# Patient Record
Sex: FEMALE | Race: WHITE | NOT HISPANIC OR LATINO | URBAN - METROPOLITAN AREA
[De-identification: names, ages, dates, MRNs, and addresses within clinical notes are randomized per-mention and may not be internally consistent; named-entity substitution may affect disease eponyms.]

---

## 2022-10-07 ENCOUNTER — NON-PROVIDER VISIT (OUTPATIENT)
Dept: URGENT CARE | Facility: PHYSICIAN GROUP | Age: 22
End: 2022-10-07

## 2022-10-07 DIAGNOSIS — Z02.1 PRE-EMPLOYMENT DRUG SCREENING: ICD-10-CM

## 2022-10-07 LAB
AMP AMPHETAMINE: NORMAL
COC COCAINE: NORMAL
INT CON NEG: NEGATIVE
INT CON POS: NEGATIVE
MET METHAMPHETAMINES: NORMAL
OPI OPIATES: NORMAL
PCP PHENCYCLIDINE: NORMAL
POC DRUG COMMENT 753798-OCCUPATIONAL HEALTH: NORMAL
THC: NORMAL

## 2022-10-07 PROCEDURE — 80305 DRUG TEST PRSMV DIR OPT OBS: CPT | Performed by: FAMILY MEDICINE

## 2024-09-22 ENCOUNTER — OFFICE VISIT (OUTPATIENT)
Dept: URGENT CARE | Facility: PHYSICIAN GROUP | Age: 24
End: 2024-09-22
Payer: MEDICAID

## 2024-09-22 ENCOUNTER — HOSPITAL ENCOUNTER (OUTPATIENT)
Facility: MEDICAL CENTER | Age: 24
End: 2024-09-22
Attending: PHYSICIAN ASSISTANT
Payer: MEDICAID

## 2024-09-22 VITALS
RESPIRATION RATE: 20 BRPM | HEIGHT: 66 IN | HEART RATE: 128 BPM | DIASTOLIC BLOOD PRESSURE: 74 MMHG | WEIGHT: 117.2 LBS | OXYGEN SATURATION: 97 % | BODY MASS INDEX: 18.84 KG/M2 | TEMPERATURE: 98.4 F | SYSTOLIC BLOOD PRESSURE: 120 MMHG

## 2024-09-22 DIAGNOSIS — R10.11 RUQ PAIN: ICD-10-CM

## 2024-09-22 DIAGNOSIS — R11.0 NAUSEA: ICD-10-CM

## 2024-09-22 DIAGNOSIS — N12 PYELONEPHRITIS: ICD-10-CM

## 2024-09-22 LAB
APPEARANCE UR: NORMAL
BILIRUB UR STRIP-MCNC: NORMAL MG/DL
COLOR UR AUTO: NORMAL
GLUCOSE UR STRIP.AUTO-MCNC: NORMAL MG/DL
KETONES UR STRIP.AUTO-MCNC: 40 MG/DL
LEUKOCYTE ESTERASE UR QL STRIP.AUTO: NORMAL
NITRITE UR QL STRIP.AUTO: NORMAL
PH UR STRIP.AUTO: 7.5 [PH] (ref 5–8)
POCT INT CON NEG: NEGATIVE
POCT INT CON POS: POSITIVE
POCT URINE PREGNANCY TEST: NEGATIVE
PROT UR QL STRIP: 100 MG/DL
RBC UR QL AUTO: NORMAL
SP GR UR STRIP.AUTO: 1.02
UROBILINOGEN UR STRIP-MCNC: 1 MG/DL

## 2024-09-22 PROCEDURE — 3074F SYST BP LT 130 MM HG: CPT | Performed by: PHYSICIAN ASSISTANT

## 2024-09-22 PROCEDURE — 81002 URINALYSIS NONAUTO W/O SCOPE: CPT | Performed by: PHYSICIAN ASSISTANT

## 2024-09-22 PROCEDURE — 87086 URINE CULTURE/COLONY COUNT: CPT

## 2024-09-22 PROCEDURE — 3078F DIAST BP <80 MM HG: CPT | Performed by: PHYSICIAN ASSISTANT

## 2024-09-22 PROCEDURE — 81025 URINE PREGNANCY TEST: CPT | Performed by: PHYSICIAN ASSISTANT

## 2024-09-22 PROCEDURE — 99204 OFFICE O/P NEW MOD 45 MIN: CPT | Mod: 25 | Performed by: PHYSICIAN ASSISTANT

## 2024-09-22 RX ORDER — SULFAMETHOXAZOLE AND TRIMETHOPRIM 800; 160 MG/1; MG/1
1 TABLET ORAL 2 TIMES DAILY
Qty: 14 TABLET | Refills: 0 | Status: SHIPPED | OUTPATIENT
Start: 2024-09-22

## 2024-09-22 RX ORDER — ONDANSETRON 4 MG/1
4 TABLET, ORALLY DISINTEGRATING ORAL EVERY 6 HOURS PRN
Qty: 15 TABLET | Refills: 0 | Status: SHIPPED | OUTPATIENT
Start: 2024-09-22

## 2024-09-22 ASSESSMENT — ENCOUNTER SYMPTOMS
CHILLS: 0
ABDOMINAL PAIN: 1
ARTHRALGIAS: 0
HEMATOCHEZIA: 0
VOMITING: 0
DIARRHEA: 0
MYALGIAS: 0
FLANK PAIN: 1
CARDIOVASCULAR NEGATIVE: 1
HEADACHES: 0
NAUSEA: 0
CONSTIPATION: 1
ANOREXIA: 0
RESPIRATORY NEGATIVE: 1
FEVER: 0

## 2024-09-22 NOTE — LETTER
September 22, 2024         Patient: Nataliya Marsh   YOB: 2000   Date of Visit: 9/22/2024           To Whom it May Concern:    Nataliya Marsh was seen in my clinic on 9/22/2024. Please excuse any absences from work this week due to acute condition.      If you have any questions or concerns, please don't hesitate to call.        Sincerely,           Alber Palomo P.A.-C.  Electronically Signed

## 2024-09-22 NOTE — PROGRESS NOTES
Subjective     Nataliya Marsh is a 24 y.o. female who presents with Abdominal Pain (Pt woke up around 2 am with cold sweats and pain in right side middle abdomen pain pt sts the pain has been in the same spot since 2 am /Pt sts the pain is also in her back on the right side )            Abdominal Pain  This is a new problem. The current episode started today. The onset quality is sudden. The problem occurs constantly. The problem has been unchanged. The pain is located in the RLQ. The quality of the pain is aching and cramping. The abdominal pain radiates to the right flank. Associated symptoms include constipation. Pertinent negatives include no anorexia, arthralgias, diarrhea, dysuria, fever, frequency, headaches, hematochezia, hematuria, melena, myalgias, nausea or vomiting. She has tried nothing for the symptoms. The treatment provided no relief. There is no history of GERD, irritable bowel syndrome or PUD.       PMH:  has no past medical history on file.  MEDS:   Current Outpatient Medications:     sulfamethoxazole-trimethoprim (BACTRIM DS) 800-160 MG tablet, Take 1 Tablet by mouth 2 times a day., Disp: 14 Tablet, Rfl: 0    ondansetron (ZOFRAN ODT) 4 MG TABLET DISPERSIBLE, Take 1 Tablet by mouth every 6 hours as needed for Nausea/Vomiting., Disp: 15 Tablet, Rfl: 0  ALLERGIES: No Known Allergies  SURGHX: No past surgical history on file.  SOCHX:    FH: family history is not on file.      Review of Systems   Constitutional:  Negative for chills and fever.   HENT: Negative.     Respiratory: Negative.     Cardiovascular: Negative.    Gastrointestinal:  Positive for abdominal pain and constipation. Negative for anorexia, blood in stool, diarrhea, hematochezia, melena, nausea and vomiting.   Genitourinary:  Positive for flank pain. Negative for dysuria, frequency, hematuria and urgency.   Musculoskeletal:  Negative for arthralgias and myalgias.   Neurological:  Negative for headaches.       Medications, Allergies,  "and current problem list reviewed today in Epic           Objective     /74   Pulse (!) 128   Temp 36.9 °C (98.4 °F) (Temporal)   Resp 20   Ht 1.676 m (5' 6\")   Wt 53.2 kg (117 lb 3.2 oz)   SpO2 97%   BMI 18.92 kg/m²      Physical Exam  Vitals and nursing note reviewed.   Constitutional:       General: She is not in acute distress.     Appearance: Normal appearance. She is well-developed. She is not ill-appearing, toxic-appearing or diaphoretic.   HENT:      Head: Normocephalic and atraumatic.      Right Ear: Tympanic membrane, ear canal and external ear normal.      Left Ear: Tympanic membrane, ear canal and external ear normal.      Nose: No congestion or rhinorrhea.      Mouth/Throat:      Mouth: Mucous membranes are moist.      Pharynx: No oropharyngeal exudate or posterior oropharyngeal erythema.   Eyes:      General:         Right eye: No discharge.         Left eye: No discharge.      Conjunctiva/sclera: Conjunctivae normal.   Cardiovascular:      Rate and Rhythm: Normal rate and regular rhythm.      Pulses: Normal pulses.      Heart sounds: Normal heart sounds.   Pulmonary:      Effort: Pulmonary effort is normal. No respiratory distress.      Breath sounds: Normal breath sounds. No stridor. No wheezing, rhonchi or rales.   Abdominal:      General: Abdomen is flat. Bowel sounds are normal. There is no distension.      Palpations: Abdomen is soft.      Tenderness: There is abdominal tenderness in the right upper quadrant. There is right CVA tenderness. There is no left CVA tenderness, guarding or rebound. Negative signs include Portillo's sign (??) and McBurney's sign.      Hernia: No hernia is present.       Musculoskeletal:      Cervical back: Normal range of motion and neck supple.      Right lower leg: No edema.      Left lower leg: No edema.   Lymphadenopathy:      Cervical: No cervical adenopathy.   Skin:     General: Skin is warm and dry.      Findings: No rash.   Neurological:      General: " No focal deficit present.      Mental Status: She is alert and oriented to person, place, and time. Mental status is at baseline.   Psychiatric:         Mood and Affect: Mood normal.         Behavior: Behavior normal.         Thought Content: Thought content normal.         Judgment: Judgment normal.                      Component  Ref Range & Units 1 d ago   GFR (CKD-EPI)  >60 mL/min/1.73 m 2 87           Component  Ref Range & Units 1 d ago   Sodium  135 - 145 mmol/L 132 Low    Potassium  3.6 - 5.5 mmol/L 4.3   Chloride  96 - 112 mmol/L 97   Co2  20 - 33 mmol/L 19 Low    Anion Gap  7.0 - 16.0 16.0   Glucose  65 - 99 mg/dL 90   Bun  8 - 22 mg/dL 10   Creatinine  0.50 - 1.40 mg/dL 0.94   Calcium  8.5 - 10.5 mg/dL 9.1   Correct Calcium  8.5 - 10.5 mg/dL 9.0   AST(SGOT)  12 - 45 U/L 26   ALT(SGPT)  2 - 50 U/L 18   Alkaline Phosphatase  30 - 99 U/L 98   Total Bilirubin  0.1 - 1.5 mg/dL 0.5   Albumin  3.2 - 4.9 g/dL 4.1   Total Protein  6.0 - 8.2 g/dL 7.2   Globulin  1.9 - 3.5 g/dL 3.1   A-G Ratio  g/dL 1.3          Component  Ref Range & Units 1 d ago   WBC  4.8 - 10.8 K/uL 16.3 High    RBC  4.20 - 5.40 M/uL 4.73   Hemoglobin  12.0 - 16.0 g/dL 14.6   Hematocrit  37.0 - 47.0 % 43.9   MCV  81.4 - 97.8 fL 92.8   MCH  27.0 - 33.0 pg 30.9   MCHC  32.2 - 35.5 g/dL 33.3   RDW  35.9 - 50.0 fL 46.4   Platelet Count  164 - 446 K/uL 259   MPV  9.0 - 12.9 fL 10.7   Neutrophils-Polys  44.00 - 72.00 % 75.80 High    Lymphocytes  22.00 - 41.00 % 11.80 Low    Monocytes  0.00 - 13.40 % 11.70   Eosinophils  0.00 - 6.90 % 0.20   Basophils  0.00 - 1.80 % 0.20   Immature Granulocytes  0.00 - 0.90 % 0.30   Nucleated RBC  0.00 - 0.20 /100 WBC 0.00   Neutrophils (Absolute)  1.82 - 7.42 K/uL 12.35 High    Comment: Includes immature neutrophils, if present.   Lymphs (Absolute)  1.00 - 4.80 K/uL 1.92   Monos (Absolute)  0.00 - 0.85 K/uL 1.91 High    Eos (Absolute)  0.00 - 0.51 K/uL 0.03   Baso (Absolute)  0.00 - 0.12 K/uL 0.04   Immature  Granulocytes (abs)  0.00 - 0.11 K/uL 0.05   NRBC (Absolute)  K/uL 0.00            Assessment & Plan        Assessment & Plan  Nausea    Orders:    ondansetron (ZOFRAN ODT) 4 MG TABLET DISPERSIBLE; Take 1 Tablet by mouth every 6 hours as needed for Nausea/Vomiting.    POCT Urinalysis    POCT PREGNANCY    CBC WITH DIFFERENTIAL; Future    Comp Metabolic Panel; Future    Pyelonephritis    Orders:    cefTRIAXone (Rocephin) 500 mg in lidocaine (Xylocaine) 1 % 2 mL for IM use    sulfamethoxazole-trimethoprim (BACTRIM DS) 800-160 MG tablet; Take 1 Tablet by mouth 2 times a day.    Urine Culture; Future    CBC WITH DIFFERENTIAL; Future    Comp Metabolic Panel; Future    RUQ pain    Orders:    US-RUQ; Future    CBC WITH DIFFERENTIAL; Future    Comp Metabolic Panel; Future            I personally reviewed prior external notes and test results pertinent to today's visit. Return to clinic or go to ED if symptoms worsen or persist. Red flag symptoms and indications for ED discussed at length. Patient/Parent/Guardian voices understanding.  AVS with post-visit instructions printed and provided or given verbally.  Follow-up with your primary care provider in 3-5 days. All side effects and potential interactions of prescribed medication discussed including allergic response, GI upset, tendon injury, rash, sedation, OCP effectiveness, etc.    Please note that this dictation was created using voice recognition software. I have made every reasonable attempt to correct obvious errors, but I expect that there are errors of grammar and possibly content that I did not discover before finalizing the note.         Patient/Parent/Guardian voices understanding.  AVS with post-visit instructions printed and provided or given verbally.  Follow-up with your primary care provider in 3-5 days. All side effects and potential interactions of prescribed medication discussed including allergic response, GI upset, tendon injury, rash, sedation, OCP effectiveness, etc.    Please note that this dictation was created using voice recognition software. I have made every reasonable attempt to correct obvious errors, but I expect that there are errors of grammar and possibly content that I did not discover before finalizing the note.

## 2024-09-23 ENCOUNTER — HOSPITAL ENCOUNTER (OUTPATIENT)
Dept: LAB | Facility: MEDICAL CENTER | Age: 24
End: 2024-09-23
Attending: PHYSICIAN ASSISTANT
Payer: MEDICAID

## 2024-09-23 DIAGNOSIS — N12 PYELONEPHRITIS: ICD-10-CM

## 2024-09-23 DIAGNOSIS — R10.11 RUQ PAIN: ICD-10-CM

## 2024-09-23 DIAGNOSIS — R11.0 NAUSEA: ICD-10-CM

## 2024-09-23 LAB
BASOPHILS # BLD AUTO: 0.2 % (ref 0–1.8)
BASOPHILS # BLD: 0.04 K/UL (ref 0–0.12)
EOSINOPHIL # BLD AUTO: 0.03 K/UL (ref 0–0.51)
EOSINOPHIL NFR BLD: 0.2 % (ref 0–6.9)
ERYTHROCYTE [DISTWIDTH] IN BLOOD BY AUTOMATED COUNT: 46.4 FL (ref 35.9–50)
HCT VFR BLD AUTO: 43.9 % (ref 37–47)
HGB BLD-MCNC: 14.6 G/DL (ref 12–16)
IMM GRANULOCYTES # BLD AUTO: 0.05 K/UL (ref 0–0.11)
IMM GRANULOCYTES NFR BLD AUTO: 0.3 % (ref 0–0.9)
LYMPHOCYTES # BLD AUTO: 1.92 K/UL (ref 1–4.8)
LYMPHOCYTES NFR BLD: 11.8 % (ref 22–41)
MCH RBC QN AUTO: 30.9 PG (ref 27–33)
MCHC RBC AUTO-ENTMCNC: 33.3 G/DL (ref 32.2–35.5)
MCV RBC AUTO: 92.8 FL (ref 81.4–97.8)
MONOCYTES # BLD AUTO: 1.91 K/UL (ref 0–0.85)
MONOCYTES NFR BLD AUTO: 11.7 % (ref 0–13.4)
NEUTROPHILS # BLD AUTO: 12.35 K/UL (ref 1.82–7.42)
NEUTROPHILS NFR BLD: 75.8 % (ref 44–72)
NRBC # BLD AUTO: 0 K/UL
NRBC BLD-RTO: 0 /100 WBC (ref 0–0.2)
PLATELET # BLD AUTO: 259 K/UL (ref 164–446)
PMV BLD AUTO: 10.7 FL (ref 9–12.9)
RBC # BLD AUTO: 4.73 M/UL (ref 4.2–5.4)
WBC # BLD AUTO: 16.3 K/UL (ref 4.8–10.8)

## 2024-09-23 PROCEDURE — 36415 COLL VENOUS BLD VENIPUNCTURE: CPT

## 2024-09-23 PROCEDURE — 85025 COMPLETE CBC W/AUTO DIFF WBC: CPT

## 2024-09-23 PROCEDURE — 80053 COMPREHEN METABOLIC PANEL: CPT

## 2024-09-24 LAB
ALBUMIN SERPL BCP-MCNC: 4.1 G/DL (ref 3.2–4.9)
ALBUMIN/GLOB SERPL: 1.3 G/DL
ALP SERPL-CCNC: 98 U/L (ref 30–99)
ALT SERPL-CCNC: 18 U/L (ref 2–50)
ANION GAP SERPL CALC-SCNC: 16 MMOL/L (ref 7–16)
AST SERPL-CCNC: 26 U/L (ref 12–45)
BILIRUB SERPL-MCNC: 0.5 MG/DL (ref 0.1–1.5)
BUN SERPL-MCNC: 10 MG/DL (ref 8–22)
CALCIUM ALBUM COR SERPL-MCNC: 9 MG/DL (ref 8.5–10.5)
CALCIUM SERPL-MCNC: 9.1 MG/DL (ref 8.5–10.5)
CHLORIDE SERPL-SCNC: 97 MMOL/L (ref 96–112)
CO2 SERPL-SCNC: 19 MMOL/L (ref 20–33)
CREAT SERPL-MCNC: 0.94 MG/DL (ref 0.5–1.4)
GFR SERPLBLD CREATININE-BSD FMLA CKD-EPI: 87 ML/MIN/1.73 M 2
GLOBULIN SER CALC-MCNC: 3.1 G/DL (ref 1.9–3.5)
GLUCOSE SERPL-MCNC: 90 MG/DL (ref 65–99)
POTASSIUM SERPL-SCNC: 4.3 MMOL/L (ref 3.6–5.5)
PROT SERPL-MCNC: 7.2 G/DL (ref 6–8.2)
SODIUM SERPL-SCNC: 132 MMOL/L (ref 135–145)

## 2024-09-24 ASSESSMENT — ENCOUNTER SYMPTOMS: BLOOD IN STOOL: 0

## 2024-09-25 LAB
BACTERIA UR CULT: NORMAL
SIGNIFICANT IND 70042: NORMAL
SITE SITE: NORMAL
SOURCE SOURCE: NORMAL

## 2024-11-15 ENCOUNTER — OFFICE VISIT (OUTPATIENT)
Dept: URGENT CARE | Facility: PHYSICIAN GROUP | Age: 24
End: 2024-11-15
Payer: MEDICAID

## 2024-11-15 ENCOUNTER — HOSPITAL ENCOUNTER (EMERGENCY)
Facility: MEDICAL CENTER | Age: 24
End: 2024-11-15
Attending: EMERGENCY MEDICINE
Payer: MEDICAID

## 2024-11-15 ENCOUNTER — APPOINTMENT (OUTPATIENT)
Dept: RADIOLOGY | Facility: MEDICAL CENTER | Age: 24
End: 2024-11-15
Attending: EMERGENCY MEDICINE
Payer: MEDICAID

## 2024-11-15 VITALS
TEMPERATURE: 98.1 F | DIASTOLIC BLOOD PRESSURE: 84 MMHG | OXYGEN SATURATION: 98 % | SYSTOLIC BLOOD PRESSURE: 104 MMHG | RESPIRATION RATE: 16 BRPM | HEART RATE: 110 BPM | BODY MASS INDEX: 18.56 KG/M2 | WEIGHT: 115 LBS

## 2024-11-15 VITALS
DIASTOLIC BLOOD PRESSURE: 68 MMHG | HEART RATE: 100 BPM | HEIGHT: 66 IN | SYSTOLIC BLOOD PRESSURE: 129 MMHG | RESPIRATION RATE: 16 BRPM | OXYGEN SATURATION: 95 % | BODY MASS INDEX: 19.2 KG/M2 | TEMPERATURE: 97.9 F | WEIGHT: 119.49 LBS

## 2024-11-15 DIAGNOSIS — J02.9 ACUTE SORE THROAT: ICD-10-CM

## 2024-11-15 DIAGNOSIS — J36 PERITONSILLAR ABSCESS: ICD-10-CM

## 2024-11-15 LAB
ALBUMIN SERPL BCP-MCNC: 4.2 G/DL (ref 3.2–4.9)
ALBUMIN/GLOB SERPL: 1.4 G/DL
ALP SERPL-CCNC: 103 U/L (ref 30–99)
ALT SERPL-CCNC: 9 U/L (ref 2–50)
ANION GAP SERPL CALC-SCNC: 11 MMOL/L (ref 7–16)
AST SERPL-CCNC: 19 U/L (ref 12–45)
BASOPHILS # BLD AUTO: 0.1 % (ref 0–1.8)
BASOPHILS # BLD: 0.02 K/UL (ref 0–0.12)
BILIRUB SERPL-MCNC: 0.3 MG/DL (ref 0.1–1.5)
BUN SERPL-MCNC: 7 MG/DL (ref 8–22)
CALCIUM ALBUM COR SERPL-MCNC: 9.1 MG/DL (ref 8.5–10.5)
CALCIUM SERPL-MCNC: 9.3 MG/DL (ref 8.5–10.5)
CHLORIDE SERPL-SCNC: 102 MMOL/L (ref 96–112)
CO2 SERPL-SCNC: 25 MMOL/L (ref 20–33)
CREAT SERPL-MCNC: 0.73 MG/DL (ref 0.5–1.4)
EOSINOPHIL # BLD AUTO: 0.04 K/UL (ref 0–0.51)
EOSINOPHIL NFR BLD: 0.3 % (ref 0–6.9)
ERYTHROCYTE [DISTWIDTH] IN BLOOD BY AUTOMATED COUNT: 45.5 FL (ref 35.9–50)
GFR SERPLBLD CREATININE-BSD FMLA CKD-EPI: 118 ML/MIN/1.73 M 2
GLOBULIN SER CALC-MCNC: 3 G/DL (ref 1.9–3.5)
GLUCOSE SERPL-MCNC: 83 MG/DL (ref 65–99)
HCG SERPL QL: NEGATIVE
HCT VFR BLD AUTO: 42 % (ref 37–47)
HGB BLD-MCNC: 13.8 G/DL (ref 12–16)
IMM GRANULOCYTES # BLD AUTO: 0.04 K/UL (ref 0–0.11)
IMM GRANULOCYTES NFR BLD AUTO: 0.3 % (ref 0–0.9)
LYMPHOCYTES # BLD AUTO: 2.25 K/UL (ref 1–4.8)
LYMPHOCYTES NFR BLD: 16.8 % (ref 22–41)
MCH RBC QN AUTO: 30 PG (ref 27–33)
MCHC RBC AUTO-ENTMCNC: 32.9 G/DL (ref 32.2–35.5)
MCV RBC AUTO: 91.3 FL (ref 81.4–97.8)
MONOCYTES # BLD AUTO: 1.24 K/UL (ref 0–0.85)
MONOCYTES NFR BLD AUTO: 9.3 % (ref 0–13.4)
NEUTROPHILS # BLD AUTO: 9.79 K/UL (ref 1.82–7.42)
NEUTROPHILS NFR BLD: 73.2 % (ref 44–72)
NRBC # BLD AUTO: 0 K/UL
NRBC BLD-RTO: 0 /100 WBC (ref 0–0.2)
PLATELET # BLD AUTO: 247 K/UL (ref 164–446)
PMV BLD AUTO: 9.3 FL (ref 9–12.9)
POTASSIUM SERPL-SCNC: 3.5 MMOL/L (ref 3.6–5.5)
PROT SERPL-MCNC: 7.2 G/DL (ref 6–8.2)
RBC # BLD AUTO: 4.6 M/UL (ref 4.2–5.4)
SODIUM SERPL-SCNC: 138 MMOL/L (ref 135–145)
WBC # BLD AUTO: 13.4 K/UL (ref 4.8–10.8)

## 2024-11-15 PROCEDURE — 700101 HCHG RX REV CODE 250: Mod: UD | Performed by: EMERGENCY MEDICINE

## 2024-11-15 PROCEDURE — 36415 COLL VENOUS BLD VENIPUNCTURE: CPT

## 2024-11-15 PROCEDURE — 87070 CULTURE OTHR SPECIMN AEROBIC: CPT

## 2024-11-15 PROCEDURE — 700117 HCHG RX CONTRAST REV CODE 255: Performed by: EMERGENCY MEDICINE

## 2024-11-15 PROCEDURE — 87077 CULTURE AEROBIC IDENTIFY: CPT

## 2024-11-15 PROCEDURE — 84703 CHORIONIC GONADOTROPIN ASSAY: CPT

## 2024-11-15 PROCEDURE — 99215 OFFICE O/P EST HI 40 MIN: CPT | Performed by: PHYSICIAN ASSISTANT

## 2024-11-15 PROCEDURE — 80053 COMPREHEN METABOLIC PANEL: CPT

## 2024-11-15 PROCEDURE — 700102 HCHG RX REV CODE 250 W/ 637 OVERRIDE(OP): Mod: UD | Performed by: EMERGENCY MEDICINE

## 2024-11-15 PROCEDURE — 700111 HCHG RX REV CODE 636 W/ 250 OVERRIDE (IP): Mod: UD | Performed by: EMERGENCY MEDICINE

## 2024-11-15 PROCEDURE — 70491 CT SOFT TISSUE NECK W/DYE: CPT

## 2024-11-15 PROCEDURE — 42700 I&D ABSCESS PERITONSILLAR: CPT

## 2024-11-15 PROCEDURE — 96365 THER/PROPH/DIAG IV INF INIT: CPT | Mod: XU

## 2024-11-15 PROCEDURE — 85025 COMPLETE CBC W/AUTO DIFF WBC: CPT

## 2024-11-15 PROCEDURE — 700105 HCHG RX REV CODE 258: Mod: UD | Performed by: EMERGENCY MEDICINE

## 2024-11-15 PROCEDURE — 99284 EMERGENCY DEPT VISIT MOD MDM: CPT

## 2024-11-15 PROCEDURE — A9270 NON-COVERED ITEM OR SERVICE: HCPCS | Mod: UD | Performed by: EMERGENCY MEDICINE

## 2024-11-15 RX ORDER — DEXAMETHASONE SODIUM PHOSPHATE 4 MG/ML
10 INJECTION, SOLUTION INTRA-ARTICULAR; INTRALESIONAL; INTRAMUSCULAR; INTRAVENOUS; SOFT TISSUE ONCE
Status: COMPLETED | OUTPATIENT
Start: 2024-11-15 | End: 2024-11-15

## 2024-11-15 RX ORDER — LIDOCAINE HYDROCHLORIDE AND EPINEPHRINE 10; 10 MG/ML; UG/ML
10 INJECTION, SOLUTION INFILTRATION; PERINEURAL ONCE
Status: COMPLETED | OUTPATIENT
Start: 2024-11-15 | End: 2024-11-15

## 2024-11-15 RX ADMIN — DEXAMETHASONE SODIUM PHOSPHATE 10 MG: 4 INJECTION INTRA-ARTICULAR; INTRALESIONAL; INTRAMUSCULAR; INTRAVENOUS; SOFT TISSUE at 17:48

## 2024-11-15 RX ADMIN — BENZOCAINE, BUTAMBEN, AND TETRACAINE HYDROCHLORIDE 1 SPRAY: .028; .004; .004 AEROSOL, SPRAY TOPICAL at 19:00

## 2024-11-15 RX ADMIN — IOHEXOL 80 ML: 350 INJECTION, SOLUTION INTRAVENOUS at 18:15

## 2024-11-15 RX ADMIN — LIDOCAINE HYDROCHLORIDE AND EPINEPHRINE 10 ML: 10; 10 INJECTION, SOLUTION INFILTRATION; PERINEURAL at 19:00

## 2024-11-15 RX ADMIN — AMPICILLIN AND SULBACTAM 3 G: 1; 2 INJECTION, POWDER, FOR SOLUTION INTRAMUSCULAR; INTRAVENOUS at 17:56

## 2024-11-15 ASSESSMENT — FIBROSIS 4 INDEX
FIB4 SCORE: 0.54
FIB4 SCORE: 0.54

## 2024-11-15 NOTE — LETTER
11/18/2024               Nataliya Marsh  [address] [facility] [address]        Dear Nataliya (MR#[id number])    This letter is sent in regards to your recent visit to the [facility] Emergency Department on 11/15/2024. During the visit, tests were performed to assist the physician in your medical diagnosis. A review of your tests requires that we notify you of the following:    Your throat culture was POSITIVE for a bacteria called streptococcus group G. The antibiotic prescribed for you (amoxicillin/clavulanate) should be active to treat this bacteria. It is important that you continue taking your antibiotic until it is finished.     Please feel free to contact me at the number below if you have any questions or concerns. Thank you for your cooperation in the matter.    Sincerely,  ED Culture Follow-Up Staff  Liban Easley, PharmD    Count includes the [facility], Emergency Department  [address] [place] [id number] [phone] (ED Culture Line)

## 2024-11-15 NOTE — LETTER
November 15, 2024         Patient: Nataliya Marsh   YOB: 2000   Date of Visit: 11/15/2024           To Whom it May Concern:    Nataliya Marsh was seen in my clinic on 11/15/2024. Please excuse any absences from work this week due to acute illness.      If you have any questions or concerns, please don't hesitate to call.        Sincerely,           Alber Palomo P.A.-C.  Electronically Signed

## 2024-11-16 NOTE — ED NOTES
PIV removed, catheter intact. Discharge education provided by ERP. Discharge paperwork signed by pt. Prescriptions to be picked up by pt. All questions answered. All belongings with pt. Pt ambulated to lobby unassisted with steady gait.

## 2024-11-16 NOTE — CONSULTS
DATE OF SERVICE:  11/15/2024     PRINCIPAL COMPLAINT:  Throat pain.     HISTORY OF PRESENT ILLNESS:  This is a 24-year-old female with a couple days   of throat pain.  She states that she has had this issue several times before   and has had to have it drained at the bedside.  She was seen at [facility] and sent here for further treatment.  Her history is otherwise   noncontributory.  She has no medications and no allergies.  She was not   started on any antibiotics at outpatient, but has been given IV antibiotics   here.     PHYSICAL EXAMINATION:  GENERAL:  She is sitting in the bed, in no distress.  VITAL SIGNS:  Her vital signs currently show she is afebrile with a pulse of   100, blood pressure 112/72, oxygenating 96% on room air.  She does have   elevated white count at 13.  HEENT:  Both ears are clear.  The nose is pink, moist and patent.  The mouth   is pink, moist.  She has a protruding left tonsil anterior pillar with uvular   deviation to the right.  She has some enlarged lymph nodes that could be seen.     DIAGNOSTIC DATA:  CT scan was reviewed that showed approximately a 2 cm   peritonsillar abscess.     At this time, it was discussed with her and recommended to be opened.  She was   topically sprayed with Cetacaine and then injected with 1% lidocaine with   epinephrine, a total of 3 mL was used and then this was opened using a 15   blade.  Hemostat was used and pop into the abscess cavity, which was drained.    She tolerated this well, was instructed to follow up with me as an outpatient   as given her history.  She will likely need tonsillectomy.  She understands   this and is encouraged to continue with her antibiotics.  She is going to   follow up with me in a couple of weeks.        ______________________________  MD DARLYN Ruiz/JADYN    DD:  11/15/2024 19:27  DT:  11/15/2024 21:57    Job#:  [id number]

## 2024-11-16 NOTE — ED TRIAGE NOTES
"Chief Complaint   Patient presents with    Abscess     Pt sent by Svetlana  for peritonsillar abscess.      Pt ambulatory to triage for above complaint. No complaints of SOB and speaking in full and complete sentences in triage.    A+Ox4, GCS 15    Placed in lobby. Educated on triage process. Encouraged to alert staff to any changes.    /72   Pulse 90   Temp 36.6 °C (97.8 °F) (Temporal)   Resp 18   Ht 1.676 m (5' 6\")   Wt 54.2 kg (119 lb 7.8 oz)   SpO2 98%   BMI 19.29 kg/m²     "

## 2024-11-16 NOTE — ED PROVIDER NOTES
"ED PHYSICIAN NOTE    CHIEF COMPLAINT  Chief Complaint   Patient presents with    Abscess     Pt sent by Svetlana JEAN for peritonsillar abscess.          HPI/ROS      Nataliya Marsh is a 24 y.o. female who presents with a sore throat.  Started a week ago.  Swelling is mostly on the left side referred from urgent care.  No difficulty breathing increased pain with swallowing.  Has had fevers.    Patient reports multiple peritonsillar abscesses drained in the emergency department.    PAST MEDICAL HISTORY  History reviewed. No pertinent past medical history.    SOCIAL HISTORY  Social History     Tobacco Use    Smoking status: Never    Smokeless tobacco: Never       CURRENT MEDICATIONS  Home Medications       Reviewed by Matty Downey R.N. (Registered Nurse) on 11/15/24 at 1645  Med List Status: Partial     Medication Last Dose Status        Patient [place] Taking any Medications                         Audit from Redirected Encounters    **Home medications have not yet been reviewed for this encounter**         ALLERGIES  No Known Allergies    PHYSICAL EXAM  VITAL SIGNS: /72   Pulse 90   Temp 36.6 °C (97.8 °F) (Temporal)   Resp 18   Ht 1.676 m (5' 6\")   Wt 54.2 kg (119 lb 7.8 oz)   SpO2 98%   BMI 19.29 kg/m²    Constitutional: Awake and alert  HENT: Asymmetric swelling of the left peritonsillar region.  Diffuse pharyngeal erythema.  Eyes: Normal inspection  Neck: Grossly normal range of motion.  Cardiovascular: Normal heart rate  Thorax & Lungs: No respiratory distress      DIAGNOSTIC STUDIES / PROCEDURES  LABS/EKG  Results for orders placed or performed during the hospital encounter of 11/15/24   CBC WITH DIFFERENTIAL    Collection Time: 11/15/24  5:36 PM   Result Value Ref Range    WBC 13.4 (H) 4.8 - 10.8 K/uL    RBC 4.60 4.20 - 5.40 M/uL    Hemoglobin 13.8 12.0 - 16.0 g/dL    Hematocrit 42.0 37.0 - 47.0 %    MCV 91.3 81.4 - 97.8 fL    MCH 30.0 27.0 - 33.0 pg    MCHC 32.9 32.2 - 35.5 g/dL    RDW 45.5 " 35.9 - 50.0 fL    Platelet Count 247 164 - 446 K/uL    MPV 9.3 9.0 - 12.9 fL    Neutrophils-Polys 73.20 (H) 44.00 - 72.00 %    Lymphocytes 16.80 (L) 22.00 - 41.00 %    Monocytes 9.30 0.00 - 13.40 %    Eosinophils 0.30 0.00 - 6.90 %    Basophils 0.10 0.00 - 1.80 %    Immature Granulocytes 0.30 0.00 - 0.90 %    Nucleated RBC 0.00 0.00 - 0.20 /100 WBC    Neutrophils (Absolute) 9.79 (H) 1.82 - 7.42 K/uL    Lymphs (Absolute) 2.25 1.00 - 4.80 K/uL    Monos (Absolute) 1.24 (H) 0.00 - 0.85 K/uL    Eos (Absolute) 0.04 0.00 - 0.51 K/uL    Baso (Absolute) 0.02 0.00 - 0.12 K/uL    Immature Granulocytes (abs) 0.04 0.00 - 0.11 K/uL    NRBC (Absolute) 0.00 K/uL   COMP METABOLIC PANEL    Collection Time: 11/15/24  5:36 PM   Result Value Ref Range    Sodium 138 135 - 145 mmol/L    Potassium 3.5 (L) 3.6 - 5.5 mmol/L    Chloride 102 96 - 112 mmol/L    Co2 25 20 - 33 mmol/L    Anion Gap 11.0 7.0 - 16.0    Glucose 83 65 - 99 mg/dL    Bun 7 (L) 8 - 22 mg/dL    Creatinine 0.73 0.50 - 1.40 mg/dL    Calcium 9.3 8.5 - 10.5 mg/dL    Correct Calcium 9.1 8.5 - 10.5 mg/dL    AST(SGOT) 19 12 - 45 U/L    ALT(SGPT) 9 2 - 50 U/L    Alkaline Phosphatase 103 (H) 30 - 99 U/L    Total Bilirubin 0.3 0.1 - 1.5 mg/dL    Albumin 4.2 3.2 - 4.9 g/dL    Total Protein 7.2 6.0 - 8.2 g/dL    Globulin 3.0 1.9 - 3.5 g/dL    A-G Ratio 1.4 g/dL   HCG QUAL SERUM    Collection Time: 11/15/24  5:36 PM   Result Value Ref Range    Beta-Hcg Qualitative Serum Negative Negative   ESTIMATED GFR    Collection Time: 11/15/24  5:36 PM   Result Value Ref Range    GFR (CKD-EPI) 118 >60 mL/min/1.73 m 2          RADIOLOGY  I have independently interpreted the diagnostic imaging associated with this visit and am waiting the final reading from the radiologist.   My preliminary interpretation is as follows: CT soft tissue neck with a left peritonsillar abscess    COURSE & MEDICAL DECISION MAKING    INITIAL ASSESSMENT, COURSE AND PLAN  Care Narrative: Patient presents with left  peritonsillar swelling.  Suspect abscess.  Airway is widely patent.  Ordered Unasyn and dexamethasone.  Obtain imaging and laboratory data.    Data returned as noted above.  Consult otolaryngology.    Patient was seen by Dr. Baldwin in the emergency department.  She performed incision and drainage.  Patient will follow-up with Dr. Baldwin in the clinic in 1 week.  I prescribed Augmentin.  I precaution patient to return to the ER for worsening, not improving, difficulty breathing or concern.    Interventions  Medications   benzocaine-butamben-tetracaine (Cetacaine) spray 1 Spray (has no administration in time range)   lidocaine-epinephrine 1 %-1:069082 1 %-1:285189 injection 10 mL (has no administration in time range)   ampicillin/sulbactam (Unasyn) 3 g in  mL IVPB (3 g Intravenous New Bag 11/15/24 1756)   dexamethasone (Decadron) injection 10 mg (10 mg Intravenous Given 11/15/24 1748)   iohexol (OMNIPAQUE) 350 mg/mL (IV) (80 mL Intravenous Given 11/15/24 1815)         DISPOSITION AND DISCUSSIONS      Escalation of care considered, and ultimately not performed:acute inpatient care management, however at this time, the patient is most appropriate for outpatient management      Prescription drugs considered and/or prescribed:   Considered opiate prescription, but nonnarcotic analgesic is most appropriate  Prescribed   New Prescriptions    AMOXICILLIN-CLAVULANATE (AUGMENTIN) 875-125 MG TAB    Take 1 Tablet by mouth 2 times a day for 10 days.     Follow up  TAMERA BALDWIN MD [address] [address]  [phone]    In 2 weeks      FINAL IMPRESSION  1.  Left peritonsillar abscess    This dictation was created using voice recognition software. The accuracy of the dictation is limited to the abilities of the software. I expect there may be some errors of grammar and possibly content. The nursing notes were reviewed and certain aspects of this information were incorporated into this  note.    Electronically signed by: Gil Rodriguez M.D., 11/15/2024

## 2024-11-17 LAB
BACTERIA SPEC RESP CULT: ABNORMAL
BACTERIA SPEC RESP CULT: ABNORMAL
SIGNIFICANT IND 70042: ABNORMAL
SITE SITE: ABNORMAL
SOURCE SOURCE: ABNORMAL

## 2024-11-18 NOTE — ED NOTES
"ED Positive Culture Follow-up/Notification Note:    Date: 11/18/2024     Patient seen in the ED on 11/15/2024 for a sore throat present for the past week. She also reports swelling on the left side and of throat, pain with swallowing, and fevers but denies any difficulty breathing.  CT of the neck showed left peritonsillar abscess measuring 2.3 x 1.8 x 1.2 cm in diameter. Incision and drainage was performed in the emergency department.    1. Peritonsillar abscess       Discharge Medication List as of 11/15/2024  7:45 PM          Allergies: Patient has no known allergies.     Vitals:    11/15/24 1641 11/15/24 1858 11/15/24 1928 11/15/24 1940   BP:   129/68    Pulse:  (!) 101 100    Resp:    16   Temp:   36.6 °C (97.9 °F) 36.6 °C (97.9 °F)   TempSrc:   Temporal Temporal   SpO2:  97% 95%    Weight: 54.2 kg (119 lb 7.8 oz)      Height: 1.676 m (5' 6\")          Final cultures:   Results       Procedure Component Value Units Date/Time    CULTURE THROAT [[id number]]  (Abnormal) Collected: 11/15/24 1832    Order Status: Completed Specimen: Throat Updated: 11/17/24 0733     Significant Indicator POS     Source THRT     Site THROAT     Culture Result Heavy growth usual upper respiratory antonio  No group A beta Streptococcus isolated.        Beta Streptococcus Group G  Moderate growth              Plan:   Throat abscess culture is positive for beta streptococcus group G.  Appropriate antibiotic therapy prescribed. No changes required based upon culture result.  Sent letter to patient to notify of positive culture result and encourage compliance with prescribed antibiotics.     Liban Easley, PharmD    "

## 2024-11-25 ASSESSMENT — ENCOUNTER SYMPTOMS
SORE THROAT: 1
CARDIOVASCULAR NEGATIVE: 1
SHORTNESS OF BREATH: 1
NEUROLOGICAL NEGATIVE: 1
CONSTITUTIONAL NEGATIVE: 1
GASTROINTESTINAL NEGATIVE: 1

## 2024-11-26 NOTE — PROGRESS NOTES
Subjective     Nataliya Marsh is a very pleasant 24 y.o. female who presents with Otalgia and Pharyngitis            HPI  Significant worsening left-sided sore throat sore throat.  Her throat is closing and she is having a difficult time breathing and swallowing.  She does have a previous history of PTA.      PMH:  has no past medical history on file.  MEDS:   Current Outpatient Medications:     amoxicillin-clavulanate (AUGMENTIN) 875-125 MG Tab, Take 1 Tablet by mouth 2 times a day for 10 days., Disp: 20 Tablet, Rfl: 0  ALLERGIES: No Known Allergies  SURGHX: No past surgical history on file.  SOCHX:  reports that she has never smoked. She has never used smokeless tobacco.  FH: family history is not on file.        Review of Systems   Constitutional: Negative.    HENT:  Positive for ear pain and sore throat.    Respiratory:  Positive for shortness of breath.    Cardiovascular: Negative.    Gastrointestinal: Negative.    Neurological: Negative.        Medications, Allergies, and current problem list reviewed today in Epic           Objective     /84   Pulse (!) 110   Temp 36.7 °C (98.1 °F) (Temporal)   Resp 16   Wt 52.2 kg (115 lb)   SpO2 98%   BMI 18.56 kg/m²      Physical Exam  Vitals and nursing note reviewed.   Constitutional:       General: She is not in acute distress.     Appearance: Normal appearance. She is well-developed. She is not ill-appearing, toxic-appearing or diaphoretic.   HENT:      Head: Normocephalic and atraumatic.      Right Ear: Hearing and external ear normal.      Left Ear: Hearing and external ear normal.      Nose: Nose normal.      Mouth/Throat:      Pharynx: Pharyngeal swelling, posterior oropharyngeal erythema and uvula swelling present. No oropharyngeal exudate.      Tonsils: Tonsillar abscess present.        Comments: Uvular deviation contralateral to area of concern  Eyes:      General:         Right eye: No discharge.         Left eye: No discharge.       Conjunctiva/sclera: Conjunctivae normal.   Cardiovascular:      Rate and Rhythm: Regular rhythm. Tachycardia present.      Heart sounds: Normal heart sounds.   Pulmonary:      Effort: Pulmonary effort is normal. No respiratory distress.      Breath sounds: Normal breath sounds. No stridor. No wheezing, rhonchi or rales.   Musculoskeletal:      Cervical back: Normal range of motion and neck supple.   Lymphadenopathy:      Cervical: Cervical adenopathy present.   Skin:     General: Skin is warm and dry.   Neurological:      General: No focal deficit present.      Mental Status: She is alert and oriented to person, place, and time.   Psychiatric:         Mood and Affect: Mood normal.         Behavior: Behavior normal.         Thought Content: Thought content normal.         Judgment: Judgment normal.                             Assessment & Plan        Assessment & Plan  Acute sore throat  This is a very pleasant 24-year-old female presenting with significant sore throat on the left side.  She is unable to swallow and now is having difficulty breathing.  However, she does deny respiratory distress.  History of previous PTA.  She is tachycardic but her pO2 is appropriate.  She is nontoxic and sitting comfortably in no apparent distress.  Exam shows concerns for left PTA with uvular deviation.  She will be taken to the ER now for higher level of care including possible procedure, imaging labs and treatment which we cannot provide in the urgent care.         Peritonsillar abscess                       Please note that this dictation was created using voice recognition software. I have made every reasonable attempt to correct obvious errors, but I expect that there are errors of grammar and possibly content that I did not discover before finalizing the note.

## 2025-01-15 ENCOUNTER — HOSPITAL ENCOUNTER (OUTPATIENT)
Dept: RADIOLOGY | Facility: MEDICAL CENTER | Age: 25
End: 2025-01-15

## 2025-01-15 ENCOUNTER — HOSPITAL ENCOUNTER (OUTPATIENT)
Facility: MEDICAL CENTER | Age: 25
End: 2025-01-16
Attending: EMERGENCY MEDICINE | Admitting: HOSPITALIST
Payer: MEDICAID

## 2025-01-15 DIAGNOSIS — J03.90 TONSILLITIS: ICD-10-CM

## 2025-01-15 DIAGNOSIS — R65.10 SIRS (SYSTEMIC INFLAMMATORY RESPONSE SYNDROME) (HCC): ICD-10-CM

## 2025-01-15 DIAGNOSIS — J36 PERITONSILLAR ABSCESS: ICD-10-CM

## 2025-01-15 PROCEDURE — 700111 HCHG RX REV CODE 636 W/ 250 OVERRIDE (IP): Mod: JZ | Performed by: EMERGENCY MEDICINE

## 2025-01-15 PROCEDURE — 700102 HCHG RX REV CODE 250 W/ 637 OVERRIDE(OP): Mod: UD | Performed by: EMERGENCY MEDICINE

## 2025-01-15 PROCEDURE — 770006 HCHG ROOM/CARE - MED/SURG/GYN SEMI*

## 2025-01-15 PROCEDURE — 99285 EMERGENCY DEPT VISIT HI MDM: CPT

## 2025-01-15 PROCEDURE — 99223 1ST HOSP IP/OBS HIGH 75: CPT | Performed by: HOSPITALIST

## 2025-01-15 PROCEDURE — 96365 THER/PROPH/DIAG IV INF INIT: CPT | Mod: XU

## 2025-01-15 PROCEDURE — 700105 HCHG RX REV CODE 258: Performed by: EMERGENCY MEDICINE

## 2025-01-15 PROCEDURE — 42700 I&D ABSCESS PERITONSILLAR: CPT

## 2025-01-15 PROCEDURE — A9270 NON-COVERED ITEM OR SERVICE: HCPCS | Mod: UD | Performed by: EMERGENCY MEDICINE

## 2025-01-15 PROCEDURE — 36415 COLL VENOUS BLD VENIPUNCTURE: CPT

## 2025-01-15 PROCEDURE — 700105 HCHG RX REV CODE 258: Performed by: HOSPITALIST

## 2025-01-15 PROCEDURE — 700101 HCHG RX REV CODE 250: Mod: UD | Performed by: EMERGENCY MEDICINE

## 2025-01-15 RX ORDER — SODIUM CHLORIDE 9 MG/ML
INJECTION, SOLUTION INTRAVENOUS CONTINUOUS
Status: DISCONTINUED | OUTPATIENT
Start: 2025-01-15 | End: 2025-01-16 | Stop reason: HOSPADM

## 2025-01-15 RX ORDER — PROMETHAZINE HYDROCHLORIDE 25 MG/1
12.5-25 SUPPOSITORY RECTAL EVERY 4 HOURS PRN
Status: DISCONTINUED | OUTPATIENT
Start: 2025-01-15 | End: 2025-01-16 | Stop reason: HOSPADM

## 2025-01-15 RX ORDER — MORPHINE SULFATE 4 MG/ML
4 INJECTION INTRAVENOUS
Status: DISCONTINUED | OUTPATIENT
Start: 2025-01-15 | End: 2025-01-16 | Stop reason: HOSPADM

## 2025-01-15 RX ORDER — AMOXICILLIN 250 MG
2 CAPSULE ORAL EVERY EVENING
Status: DISCONTINUED | OUTPATIENT
Start: 2025-01-15 | End: 2025-01-16 | Stop reason: HOSPADM

## 2025-01-15 RX ORDER — ONDANSETRON 2 MG/ML
4 INJECTION INTRAMUSCULAR; INTRAVENOUS EVERY 4 HOURS PRN
Status: DISCONTINUED | OUTPATIENT
Start: 2025-01-15 | End: 2025-01-16 | Stop reason: HOSPADM

## 2025-01-15 RX ORDER — LIDOCAINE HYDROCHLORIDE AND EPINEPHRINE 10; 10 MG/ML; UG/ML
10 INJECTION, SOLUTION INFILTRATION; PERINEURAL ONCE
Status: COMPLETED | OUTPATIENT
Start: 2025-01-15 | End: 2025-01-15

## 2025-01-15 RX ORDER — OXYCODONE HYDROCHLORIDE 10 MG/1
10 TABLET ORAL
Status: DISCONTINUED | OUTPATIENT
Start: 2025-01-15 | End: 2025-01-16 | Stop reason: HOSPADM

## 2025-01-15 RX ORDER — POLYETHYLENE GLYCOL 3350 17 G/17G
1 POWDER, FOR SOLUTION ORAL
Status: DISCONTINUED | OUTPATIENT
Start: 2025-01-15 | End: 2025-01-16 | Stop reason: HOSPADM

## 2025-01-15 RX ORDER — PROCHLORPERAZINE EDISYLATE 5 MG/ML
5-10 INJECTION INTRAMUSCULAR; INTRAVENOUS EVERY 4 HOURS PRN
Status: DISCONTINUED | OUTPATIENT
Start: 2025-01-15 | End: 2025-01-16 | Stop reason: HOSPADM

## 2025-01-15 RX ORDER — OXYCODONE HYDROCHLORIDE 5 MG/1
5 TABLET ORAL
Status: DISCONTINUED | OUTPATIENT
Start: 2025-01-15 | End: 2025-01-16 | Stop reason: HOSPADM

## 2025-01-15 RX ORDER — ONDANSETRON 4 MG/1
4 TABLET, ORALLY DISINTEGRATING ORAL EVERY 4 HOURS PRN
Status: DISCONTINUED | OUTPATIENT
Start: 2025-01-15 | End: 2025-01-16 | Stop reason: HOSPADM

## 2025-01-15 RX ORDER — KETOROLAC TROMETHAMINE 15 MG/ML
15 INJECTION, SOLUTION INTRAMUSCULAR; INTRAVENOUS EVERY 6 HOURS PRN
Status: DISCONTINUED | OUTPATIENT
Start: 2025-01-15 | End: 2025-01-16 | Stop reason: HOSPADM

## 2025-01-15 RX ORDER — ACETAMINOPHEN 325 MG/1
650 TABLET ORAL EVERY 6 HOURS PRN
Status: DISCONTINUED | OUTPATIENT
Start: 2025-01-15 | End: 2025-01-16 | Stop reason: HOSPADM

## 2025-01-15 RX ORDER — PROMETHAZINE HYDROCHLORIDE 25 MG/1
12.5-25 TABLET ORAL EVERY 4 HOURS PRN
Status: DISCONTINUED | OUTPATIENT
Start: 2025-01-15 | End: 2025-01-16 | Stop reason: HOSPADM

## 2025-01-15 RX ORDER — LABETALOL HYDROCHLORIDE 5 MG/ML
10 INJECTION, SOLUTION INTRAVENOUS EVERY 4 HOURS PRN
Status: DISCONTINUED | OUTPATIENT
Start: 2025-01-15 | End: 2025-01-16 | Stop reason: HOSPADM

## 2025-01-15 RX ADMIN — SODIUM CHLORIDE: 9 INJECTION, SOLUTION INTRAVENOUS at 21:07

## 2025-01-15 RX ADMIN — BENZOCAINE, BUTAMBEN, AND TETRACAINE HYDROCHLORIDE 1 SPRAY: .028; .004; .004 AEROSOL, SPRAY TOPICAL at 18:09

## 2025-01-15 RX ADMIN — AMPICILLIN SODIUM, SULBACTAM SODIUM 3 G: 2; 1 INJECTION, POWDER, FOR SOLUTION INTRAMUSCULAR; INTRAVENOUS at 20:02

## 2025-01-15 RX ADMIN — LIDOCAINE HYDROCHLORIDE AND EPINEPHRINE 10 ML: 10; 10 INJECTION, SOLUTION INFILTRATION; PERINEURAL at 18:09

## 2025-01-15 SDOH — ECONOMIC STABILITY: TRANSPORTATION INSECURITY
IN THE PAST 12 MONTHS, HAS THE LACK OF TRANSPORTATION KEPT YOU FROM MEDICAL APPOINTMENTS OR FROM GETTING MEDICATIONS?: NO

## 2025-01-15 SDOH — ECONOMIC STABILITY: TRANSPORTATION INSECURITY
IN THE PAST 12 MONTHS, HAS LACK OF RELIABLE TRANSPORTATION KEPT YOU FROM MEDICAL APPOINTMENTS, MEETINGS, WORK OR FROM GETTING THINGS NEEDED FOR DAILY LIVING?: NO

## 2025-01-15 ASSESSMENT — LIFESTYLE VARIABLES
HAVE PEOPLE ANNOYED YOU BY CRITICIZING YOUR DRINKING: NO
TOTAL SCORE: 0
ON A TYPICAL DAY WHEN YOU DRINK ALCOHOL HOW MANY DRINKS DO YOU HAVE: 0
EVER FELT BAD OR GUILTY ABOUT YOUR DRINKING: NO
HAVE YOU EVER FELT YOU SHOULD CUT DOWN ON YOUR DRINKING: NO
TOTAL SCORE: 0
CONSUMPTION TOTAL: NEGATIVE
TOTAL SCORE: 0
AVERAGE NUMBER OF DAYS PER WEEK YOU HAVE A DRINK CONTAINING ALCOHOL: 0
EVER HAD A DRINK FIRST THING IN THE MORNING TO STEADY YOUR NERVES TO GET RID OF A HANGOVER: NO
DOES PATIENT WANT TO STOP DRINKING: NO
HOW MANY TIMES IN THE PAST YEAR HAVE YOU HAD 5 OR MORE DRINKS IN A DAY: 0
ALCOHOL_USE: NO

## 2025-01-15 ASSESSMENT — COGNITIVE AND FUNCTIONAL STATUS - GENERAL
SUGGESTED CMS G CODE MODIFIER DAILY ACTIVITY: CH
SUGGESTED CMS G CODE MODIFIER MOBILITY: CH
DAILY ACTIVITIY SCORE: 24
MOBILITY SCORE: 24

## 2025-01-15 ASSESSMENT — PATIENT HEALTH QUESTIONNAIRE - PHQ9
SUM OF ALL RESPONSES TO PHQ9 QUESTIONS 1 AND 2: 0
1. LITTLE INTEREST OR PLEASURE IN DOING THINGS: NOT AT ALL
2. FEELING DOWN, DEPRESSED, IRRITABLE, OR HOPELESS: NOT AT ALL

## 2025-01-15 ASSESSMENT — ENCOUNTER SYMPTOMS
HEADACHES: 0
SORE THROAT: 1
NERVOUS/ANXIOUS: 0
NAUSEA: 0
SHORTNESS OF BREATH: 0
FEVER: 0

## 2025-01-15 ASSESSMENT — SOCIAL DETERMINANTS OF HEALTH (SDOH)
WITHIN THE LAST YEAR, HAVE TO BEEN RAPED OR FORCED TO HAVE ANY KIND OF SEXUAL ACTIVITY BY YOUR PARTNER OR EX-PARTNER?: NO
WITHIN THE PAST 12 MONTHS, YOU WORRIED THAT YOUR FOOD WOULD RUN OUT BEFORE YOU GOT THE MONEY TO BUY MORE: NEVER TRUE
WITHIN THE LAST YEAR, HAVE YOU BEEN HUMILIATED OR EMOTIONALLY ABUSED IN OTHER WAYS BY YOUR PARTNER OR EX-PARTNER?: NO
WITHIN THE PAST 12 MONTHS, THE FOOD YOU BOUGHT JUST DIDN'T LAST AND YOU DIDN'T HAVE MONEY TO GET MORE: NEVER TRUE
IN THE PAST 12 MONTHS, HAS THE ELECTRIC, GAS, OIL, OR WATER COMPANY THREATENED TO SHUT OFF SERVICE IN YOUR HOME?: NO
WITHIN THE LAST YEAR, HAVE YOU BEEN KICKED, HIT, SLAPPED, OR OTHERWISE PHYSICALLY HURT BY YOUR PARTNER OR EX-PARTNER?: NO
WITHIN THE LAST YEAR, HAVE YOU BEEN AFRAID OF YOUR PARTNER OR EX-PARTNER?: NO

## 2025-01-15 ASSESSMENT — FIBROSIS 4 INDEX
FIB4 SCORE: 0.59
FIB4 SCORE: 0.59

## 2025-01-15 ASSESSMENT — PAIN DESCRIPTION - PAIN TYPE: TYPE: ACUTE PAIN

## 2025-01-16 ENCOUNTER — PHARMACY VISIT (OUTPATIENT)
Dept: PHARMACY | Facility: MEDICAL CENTER | Age: 25
End: 2025-01-16
Payer: COMMERCIAL

## 2025-01-16 VITALS
OXYGEN SATURATION: 95 % | SYSTOLIC BLOOD PRESSURE: 98 MMHG | TEMPERATURE: 97.7 F | BODY MASS INDEX: 17.4 KG/M2 | HEART RATE: 78 BPM | WEIGHT: 108.25 LBS | DIASTOLIC BLOOD PRESSURE: 56 MMHG | RESPIRATION RATE: 17 BRPM | HEIGHT: 66 IN

## 2025-01-16 LAB
ANION GAP SERPL CALC-SCNC: 15 MMOL/L (ref 7–16)
BUN SERPL-MCNC: 15 MG/DL (ref 8–22)
CALCIUM SERPL-MCNC: 9.1 MG/DL (ref 8.5–10.5)
CHLORIDE SERPL-SCNC: 98 MMOL/L (ref 96–112)
CO2 SERPL-SCNC: 22 MMOL/L (ref 20–33)
CREAT SERPL-MCNC: 0.62 MG/DL (ref 0.5–1.4)
ERYTHROCYTE [DISTWIDTH] IN BLOOD BY AUTOMATED COUNT: 43.2 FL (ref 35.9–50)
GFR SERPLBLD CREATININE-BSD FMLA CKD-EPI: 128 ML/MIN/1.73 M 2
GLUCOSE SERPL-MCNC: 177 MG/DL (ref 65–99)
HCT VFR BLD AUTO: 39.9 % (ref 37–47)
HGB BLD-MCNC: 13.6 G/DL (ref 12–16)
MCH RBC QN AUTO: 30.2 PG (ref 27–33)
MCHC RBC AUTO-ENTMCNC: 34.1 G/DL (ref 32.2–35.5)
MCV RBC AUTO: 88.7 FL (ref 81.4–97.8)
PLATELET # BLD AUTO: 279 K/UL (ref 164–446)
PMV BLD AUTO: 10.1 FL (ref 9–12.9)
POTASSIUM SERPL-SCNC: 3.9 MMOL/L (ref 3.6–5.5)
RBC # BLD AUTO: 4.5 M/UL (ref 4.2–5.4)
SODIUM SERPL-SCNC: 135 MMOL/L (ref 135–145)
WBC # BLD AUTO: 9.3 K/UL (ref 4.8–10.8)

## 2025-01-16 PROCEDURE — 96366 THER/PROPH/DIAG IV INF ADDON: CPT

## 2025-01-16 PROCEDURE — G0378 HOSPITAL OBSERVATION PER HR: HCPCS

## 2025-01-16 PROCEDURE — 700105 HCHG RX REV CODE 258: Mod: UD | Performed by: HOSPITALIST

## 2025-01-16 PROCEDURE — 99239 HOSP IP/OBS DSCHRG MGMT >30: CPT | Performed by: STUDENT IN AN ORGANIZED HEALTH CARE EDUCATION/TRAINING PROGRAM

## 2025-01-16 PROCEDURE — 80048 BASIC METABOLIC PNL TOTAL CA: CPT

## 2025-01-16 PROCEDURE — RXMED WILLOW AMBULATORY MEDICATION CHARGE: Performed by: STUDENT IN AN ORGANIZED HEALTH CARE EDUCATION/TRAINING PROGRAM

## 2025-01-16 PROCEDURE — 36415 COLL VENOUS BLD VENIPUNCTURE: CPT

## 2025-01-16 PROCEDURE — 85027 COMPLETE CBC AUTOMATED: CPT

## 2025-01-16 PROCEDURE — 700111 HCHG RX REV CODE 636 W/ 250 OVERRIDE (IP): Mod: JZ,UD | Performed by: HOSPITALIST

## 2025-01-16 PROCEDURE — 90471 IMMUNIZATION ADMIN: CPT

## 2025-01-16 PROCEDURE — 90656 IIV3 VACC NO PRSV 0.5 ML IM: CPT | Performed by: HOSPITALIST

## 2025-01-16 RX ORDER — NICOTINE 21 MG/24HR
21 PATCH, TRANSDERMAL 24 HOURS TRANSDERMAL
Status: DISCONTINUED | OUTPATIENT
Start: 2025-01-16 | End: 2025-01-16 | Stop reason: HOSPADM

## 2025-01-16 RX ADMIN — AMPICILLIN AND SULBACTAM 3 G: 1; 2 INJECTION, POWDER, FOR SOLUTION INTRAMUSCULAR; INTRAVENOUS at 06:03

## 2025-01-16 RX ADMIN — INFLUENZA A VIRUS A/VICTORIA/4897/2022 IVR-238 (H1N1) ANTIGEN (FORMALDEHYDE INACTIVATED), INFLUENZA A VIRUS A/CALIFORNIA/122/2022 SAN-022 (H3N2) ANTIGEN (FORMALDEHYDE INACTIVATED), AND INFLUENZA B VIRUS B/MICHIGAN/01/2021 ANTIGEN (FORMALDEHYDE INACTIVATED) 0.5 ML: 15; 15; 15 INJECTION, SUSPENSION INTRAMUSCULAR at 00:26

## 2025-01-16 RX ADMIN — AMPICILLIN AND SULBACTAM 3 G: 1; 2 INJECTION, POWDER, FOR SOLUTION INTRAMUSCULAR; INTRAVENOUS at 00:26

## 2025-01-16 NOTE — ED PROVIDER NOTES
ED Provider Note  CHIEF COMPLAINT  Chief Complaint   Patient presents with    Sore Throat     Pt transferred from [facility] for dx peritonsillar abscess and further evaluation by ENT        HPI  Nataliya Marsh is a 24 y.o. female who presents in transfer from [facility] for a recurrent left-sided peritonsillar abscess.  Patient notes this is actually the sixth time she has had an abscess in the area and she was last seen in November at this facility.  She also notes that she saw an ENT surgeon who drained the abscess in the emergency department and arranged follow-up.  The patient notes that she did not follow-up as recommended by the ENT surgeon and now has the same issue.  EXTERNAL RECORDS REVIEWED  Reviewed last ED note in November 2024 for peritonsillar abscess on the left.  ROS  Constitutional: No fevers or chills  Skin: No rashes  HEENT: Painful swallowing  Neck: No neck pain  Chest: No pain or rashes  Pulm: No shortness of breath, cough, wheezing, stridor, or pain with inspiration/expiration  Gastrointestinal: No nausea, vomiting, diarrhea, or abdominal pain.  Heme: No bleeding or bruising problems.   Immuno: No hx of recurrent infections        LIMITATION TO HISTORY   None  OUTSIDE HISTORIAN(S):  None        PAST FAM HISTORY  History reviewed. No pertinent family history.    PAST MEDICAL HISTORY   Recurrent left-sided peritonsillar abscesses    SOCIAL HISTORY  Social History     Tobacco Use    Smoking status: Never    Smokeless tobacco: Never   Vaping Use    Vaping status: Every Day    Substances: Nicotine   Substance and Sexual Activity    Alcohol use: Not Currently    Drug use: Yes     Types: Inhaled     Comment: marijuana    Sexual activity: Not on file       SURGICAL HISTORY  patient denies any surgical history    CURRENT MEDICATIONS  Home Medications       Reviewed by Rebeca Kenney R.N. (Registered Nurse) on 01/15/25 at 1634  Med List Status: Not  "Addressed     Medication Last Dose Status        Patient [place] Taking any Medications                            ALLERGIES  No Known Allergies    PHYSICAL EXAM  VITAL SIGNS: /66   Pulse (!) 102   Temp 35.9 °C (96.7 °F) (Temporal)   Resp 20   Ht 1.676 m (5' 6\")   Wt 47.2 kg (104 lb)   SpO2 96%   BMI 16.79 kg/m²    Gen: Alert in no apparent distress.  HEENT: No signs of trauma, Bilateral external ears normal, Nose normal. Conjunctiva normal, Non-icteric.  Posterior pharynx erythema with left-sided peritonsillar edema noted.  Patient is phonating and tolerating her secretions without apparent distress.  Neck: Mild tenderness inferior to the left angle of mandible.  Cardiovascular: Regular rate and rhythm, no murmurs.  Capillary refill less than 3 seconds to all extremities, 2+ distal pulses.  Thorax & Lungs: Normal breath sounds, No respiratory distress, No wheezing bilateral chest rise  Skin: Warm, Dry, No erythema, No rash noted to exposed areas.   Back: No bony tenderness, No CVA tenderness.   Extremities: Intact distal pulses, No edema  Neurologic: Alert , no facial droop, grossly normal coordination and strength  Psychiatric: Affect pleasant    INITIAL IMPRESSION  Patient arrives in transfer for ENT consultation and evaluation if possible.  Patient has had multiple issues in the same area and will likely need her tonsils removed and/or the abscess drained during this hospitalization however I will discuss the case with the on-call ENT surgeon to discuss options.  Although the patient does not appear toxic, she is tachycardic and does have a white blood cell count over 17,000 and from labs performed at the outside facility.  Given her SIRS criteria and the recurrent nature of the problem, I feel outpatient therapy is not in the best interest of the patient.      ED observation? No  Reviewed labs and imaging from outside facility.  Noted that the CT read the abnormality has a \"tonsillar " "abscess.\"      RADIOLOGY  CT-OUTSIDE IMAGES-CT NECK   Final Result        Incision and Drainage Procedure Note    Indication: Peritonsillar abscess    Procedure: The patient was positioned in the sitting position and local anesthesia was Cetacaine spray and then 1% lidocaine with epinephrine in the area of the maximal swelling.  An 18-gauge needle attached to a 10 cc syringe was inserted sequentially in 3 positions in the inferior, middle, and superior pole of the tonsillar pillar with no fluid withdrawn.  Bleeding was minimal and the patient tolerated the procedure very well.    The patient tolerated the procedure well.    Complications: None     ASSESSMENT, COURSE AND PLAN  Care Narrative: Patient was discussed with the on-call ENT surgeon, Dr. Chevalier, who felt it was reasonable to attempt drainage in the emergency department prior to admission for IV antibiotics.  Notable that the patient had seen an ENT surgeon, Dr. Cordero, at the hospital after her last episode however she never followed up.  Dr. Chevalier will inform Dr. Cordero of the patient's hospitalization so that she could see the patient in the morning.  In the meantime, the patient will be admitted for IV antibiotics.  I did attempt to drain the abscess however I was unable to elicit any fluid withdrawal even despite the size of the abscess on CT.  I did discuss the case with the hospitalist who will evaluate the patient in the emergency department.        I have discussed management of the patient with the following physicians and JAYLENE's: Dr. Chevalier, ENT surgeon.  Dr. Ang, hospitalist    Escalation of care considered, and ultimately not performed: Reimaging, repeat laboratory evaluation considered but felt unlikely to benefit the patient emergently    Barriers to care at this time, including but not limited to: None.     Decision tools and Rx drugs considered including, but not limited to : Antibiotics    Discussion of management with other " QHP or appropriate source(s): None        FINAL IMPRESSION  1. Peritonsillar abscess    2. SIRS (systemic inflammatory response syndrome) (HCC)        Electronically signed by: George Sullivan M.D., 1/15/2025 5:56 PM    no

## 2025-01-16 NOTE — DISCHARGE PLANNING
Patient rolled back to observation status per attending MD determination, Sobeida Soriano, and UR committee IPA secondary reviewer, Pablo Campos. Condition code 44 completed.

## 2025-01-16 NOTE — ED NOTES
Med Rec complete per patient   Allergies reviewed  Antibiotics in the past 30 days:no  Anticoagulant in past 14 days:no  Pharmacy patient utilizes:[facility] in [place]    Pt states she does not take any RX medications nor OTC meds in past 30 days. And is not on B/C.

## 2025-01-16 NOTE — CARE PLAN
The patient is Stable - Low risk of patient condition declining or worsening    Shift Goals  Clinical Goals: NPO at midnight, pt safety, admission  Patient Goals: Rest, comfort    Progress made toward(s) clinical / shift goals:  Patient alert and oriented x4, declines pain or discomfort. Patient respirations even and unlabored, denies SOB. Patient OOB ambulating hallway. Patient with IV abx, administered flu vaccine per MAR. Patient NPO at midnight. Patient with bed in lowest position, call light within reach.      Problem: Knowledge Deficit - Standard  Goal: Patient and family/care givers will demonstrate understanding of plan of care, disease process/condition, diagnostic tests and medications  Outcome: Progressing     Problem: Pain Management  Goal: Pain level will decrease to patient's comfort goal  Outcome: Progressing     Problem: Infection  Goal: Will remain free from infection  Outcome: Progressing     Problem: Mobility  Goal: Risk for activity intolerance will decrease  Outcome: Progressing       Patient is not progressing towards the following goals:

## 2025-01-16 NOTE — PROGRESS NOTES
4 Eyes Skin Assessment Completed by TAYLOR Coreas and TAYLOR Gonzalez.    Head WDL  Ears WDL  Nose WDL  Mouth WDL  Neck WDL  Breast/Chest WDL  Shoulder Blades WDL  Spine WDL  (R) Arm/Elbow/Hand WDL  (L) Arm/Elbow/Hand WDL  Abdomen WDL  Groin WDL  Scrotum/Coccyx/Buttocks Redness and Blanching  (R) Leg WDL  (L) Leg WDL  (R) Heel/Foot/Toe Redness and Blanching  (L) Heel/Foot/Toe Redness and Blanching          Devices In Places PIV      Interventions In Place Pressure Redistribution Mattress    Possible Skin Injury No    Pictures Uploaded Into Epic Yes  Wound Consult Placed N/A  RN Wound Prevention Protocol Ordered No

## 2025-01-16 NOTE — DISCHARGE SUMMARY
Discharge Summary    CHIEF COMPLAINT ON ADMISSION  Chief Complaint   Patient presents with    Sore Throat     Pt transferred from [facility] for dx peritonsillar abscess and further evaluation by ENT        Reason for Admission  ems     Admission Date  1/15/2025    CODE STATUS  Full Code    HPI & HOSPITAL COURSE    Nataliya Marsh is a 24-year-old female with PMHx peritonsillar abscess.  Admitted 1/15 for recurrent peritonsillar abscess.    Per history-patient has a history of peritonsillar swelling/abscess requiring I&D.  Recently, patient noticed painful swallowing and swelling to the back of her throat.  She was started on clindamycin without improvement.  She has not followed with ENT.    In the ED: Throat swab positive for group A strep and beta strep group G.  CT neck: (Per report) 16 x 18 x 18 cm left tonsillar abscess present.  I discussed CT results with our radiology department. Abscess measures 2 x 2 x 2 mm in size. Patient was started on IV Unasyn.  ENT was consulted.    I discussed patient's case with Dr. Cordero over the phone.  Based on CT scan-patient has tonsillitis.  Recommending close outpatient follow-up and antibiotics.  Patient adamantly requesting to discharge.  She does not wish to stay for IV antibiotics.  I will discharge her with a 10-day course of p.o. Augmentin.  I have provided her with Dr. Cordero's office phone number for follow-up.    Therefore, she is discharged in fair and stable condition to home with close outpatient follow-up.    The patient recovered much more quickly than anticipated on admission.    Discharge Date  1/16/2025    FOLLOW UP ITEMS POST DISCHARGE  Follow-up with Dr. Cordero, ENT as soon as possible    DISCHARGE DIAGNOSES  Principal Problem:    Peritonsillar abscess (POA: Yes)  Resolved Problems:    * No resolved hospital problems. *      FOLLOW UP  No future appointments.  Hellen Cordero M.D.  [address] [place] [address]  [id number]  [phone]    Follow up in 3 day(s)        MEDICATIONS ON DISCHARGE     Medication List        START taking these medications        Instructions   amoxicillin-clavulanate 875-125 MG Tabs  Commonly known as: Augmentin   Take 1 Tablet by mouth 2 times a day for 10 days.  Dose: 1 Tablet              Allergies  No Known Allergies    DIET  No orders of the defined types were placed in this encounter.      ACTIVITY  As tolerated.  Weight bearing as tolerated    CONSULTATIONS  ENT    PROCEDURES  None    LABORATORY  Lab Results   Component Value Date    SODIUM 135 01/16/2025    POTASSIUM 3.9 01/16/2025    CHLORIDE 98 01/16/2025    CO2 22 01/16/2025    GLUCOSE 177 (H) 01/16/2025    BUN 15 01/16/2025    CREATININE 0.62 01/16/2025        Lab Results   Component Value Date    WBC 9.3 01/16/2025    HEMOGLOBIN 13.6 01/16/2025    HEMATOCRIT 39.9 01/16/2025    PLATELETCT 279 01/16/2025      CT-OUTSIDE IMAGES-CT NECK   Final Result            Total time of the discharge process exceeds 52 minutes.

## 2025-01-16 NOTE — ED TRIAGE NOTES
"Chief Complaint   Patient presents with    Sore Throat     Pt transferred from [facility] for dx peritonsillar abscess and further evaluation by ENT        BIB EMS from [facility] for above complaint pt reports sore throat and ear pain that began 3 days ago, pt has hx of peritonsillar abscess in past and was supposed to follow up with ENT and have her tonsils removed but was unable to follow up. Pt denies fever, chill, or body aches. Pt was given decadron and clindamycin at previous facility.           /66   Pulse (!) 102   Temp 35.9 °C (96.7 °F) (Temporal)   Resp 20   Ht 1.676 m (5' 6\")   Wt 47.2 kg (104 lb)   SpO2 96%   BMI 16.79 kg/m²     "

## 2025-01-16 NOTE — H&P
Hospital Medicine History & Physical Note    Date of Service  1/15/2025    Primary Care Physician  Pcp Pt States None    Consultants  ENT    Specialist Names:     Code Status  Full Code    Chief Complaint  Chief Complaint   Patient presents with    Sore Throat     Pt transferred from [facility] for dx peritonsillar abscess and further evaluation by ENT        History of Presenting Illness  Nataliya Marsh is a 24 y.o. female who presented 1/15/2025 with painful swallowing and swelling of back of her mouth/throat.  She has an enlarged left tonsil.  She had prior peritonsillar swelling/abscess and was on antibiotics.  She had some left over antibiotics and three days ago started back on clindamycin without improvement. Per notes she had prior I+D of peritonsillar abscess and was suppose to follow up with ENT but did not.      In the ER, WBC:13.4.  Throat swab positive for Group A beta Strep and Beta Strep grout G.  ER MD spoke to mitzy ENt and Dr Cordero to see patient in am per Dr Chevalier.    I discussed the plan of care with patient.    Review of Systems  Review of Systems   Constitutional:  Negative for fever and malaise/fatigue.   HENT:  Positive for sore throat.    Respiratory:  Negative for shortness of breath.    Cardiovascular:  Negative for chest pain and leg swelling.   Gastrointestinal:  Negative for nausea.   Neurological:  Negative for headaches.   Psychiatric/Behavioral:  The patient is not nervous/anxious.        Past Medical History  Recurrent peritonsillar abscess    Surgical History  None     Family History  family history is not on file.   Family history reviewed with patient. There is no family history that is pertinent to the chief complaint.     Social History   reports that she has never smoked. She has never used smokeless tobacco. She reports that she does not currently use alcohol. She reports current drug use. Drug: Inhaled. Single.    Allergies  No Known  "Allergies    Medications  None       Physical Exam  Temp:  [35.9 °C (96.7 °F)] 35.9 °C (96.7 °F)  Pulse:  [102] 102  Resp:  [20] 20  BP: (118)/(66) 118/66  SpO2:  [96 %] 96 %  Blood Pressure: 118/66   Temperature: 35.9 °C (96.7 °F)   Pulse: (!) 102   Respiration: 20   Pulse Oximetry: 96 %       Physical Exam  Vitals reviewed.   Constitutional:       Appearance: She is not ill-appearing.   HENT:      Head: Normocephalic and atraumatic.      Nose: Nose normal.      Mouth/Throat:      Mouth: Mucous membranes are moist.      Comments: Left tonsil enlarged with white spot around posterior swelling  Eyes:      Extraocular Movements: Extraocular movements intact.      Conjunctiva/sclera: Conjunctivae normal.   Cardiovascular:      Rate and Rhythm: Normal rate and regular rhythm.      Heart sounds: No murmur heard.  Pulmonary:      Effort: No respiratory distress.      Breath sounds: Normal breath sounds.   Abdominal:      General: Bowel sounds are normal. There is no distension.      Palpations: Abdomen is soft.      Tenderness: There is no abdominal tenderness.   Musculoskeletal:      Cervical back: Neck supple.      Right lower leg: No edema.      Left lower leg: No edema.   Lymphadenopathy:      Cervical: No cervical adenopathy.   Skin:     General: Skin is dry.      Coloration: Skin is not jaundiced.   Neurological:      General: No focal deficit present.      Mental Status: She is alert and oriented to person, place, and time.      Cranial Nerves: No cranial nerve deficit.   Psychiatric:         Mood and Affect: Mood normal.         Laboratory:          No results for input(s): \"ALTSGPT\", \"ASTSGOT\", \"ALKPHOSPHAT\", \"TBILIRUBIN\", \"DBILIRUBIN\", \"GAMMAGT\", \"AMYLASE\", \"LIPASE\", \"ALB\", \"PREALBUMIN\", \"GLUCOSE\" in the last 72 hours.      No results for input(s): \"NTPROBNP\" in the last 72 hours.      No results for input(s): \"TROPONINT\" in the last 72 hours.    Imaging:  CT-OUTSIDE IMAGES-CT NECK   Final Result    "         Assessment/Plan:  Justification for Admission Status  I anticipate this patient will require at least two midnights for appropriate medical management, necessitating inpatient admission because peritonsillar abscess requiring antibiotics and probable tonsillectomy or Incision for drainage    Patient will need a Med/Surg bed on MEDICAL service .  The need is secondary to IV fluids due to difficulty swallowing and surgical intervention.    * Peritonsillar abscess- (present on admission)  Assessment & Plan  Unasyn  Was on clindamycin outpatient  Pain control  ENT to consult with possible tonsilectomy vs I+D  NPO after midnight        VTE prophylaxis: SCDs/TEDs.

## 2025-01-16 NOTE — ASSESSMENT & PLAN NOTE
Unasyn  Was on clindamycin outpatient  Pain control  ENT to consult with possible tonsilectomy vs I+D  NPO after midnight

## 2025-01-16 NOTE — HOSPITAL COURSE
Nataliya Marsh is a 24-year-old female with PMHx peritonsillar abscess.  Admitted 1/15 for recurrent peritonsillar abscess.    Per history-patient has a history of peritonsillar swelling/abscess requiring I&D.  Recently, patient noticed painful swallowing and swelling to the back of her throat.  She was started on clindamycin without improvement.  She has not followed with ENT.    In the ED: Throat swab positive for group A strep and beta strep group G.  CT neck: (Per report) 16 x 18 x 18 cm left tonsillar abscess present.  I discussed CT results with our radiology department. Abscess measures 2 x 2 x 2 mm in size. Patient was started on IV Unasyn.  ENT was consulted.    I discussed patient's case with Dr. Cordero over the phone.  Based on CT scan-patient has tonsillitis.  Recommending close outpatient follow-up and antibiotics.  Patient adamantly requesting to discharge.  She does not wish to stay for IV antibiotics.  I will discharge her with a 10-day course of p.o. Augmentin.  I have provided her with Dr. Cordero's office phone number for follow-up.